# Patient Record
Sex: MALE | Race: OTHER | URBAN - NONMETROPOLITAN AREA
[De-identification: names, ages, dates, MRNs, and addresses within clinical notes are randomized per-mention and may not be internally consistent; named-entity substitution may affect disease eponyms.]

---

## 2019-08-01 ENCOUNTER — OFFICE VISIT (OUTPATIENT)
Dept: PRIMARY CARE CLINIC | Age: 34
End: 2019-08-01

## 2019-08-01 VITALS
RESPIRATION RATE: 18 BRPM | OXYGEN SATURATION: 98 % | HEART RATE: 84 BPM | WEIGHT: 242 LBS | TEMPERATURE: 98.2 F | DIASTOLIC BLOOD PRESSURE: 74 MMHG | SYSTOLIC BLOOD PRESSURE: 116 MMHG
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
## 2019-08-01 PROCEDURE — 99202 OFFICE O/P NEW SF 15 MIN: CPT | Performed by: NURSE PRACTITIONER

## 2019-08-01 RX ORDER — IBUPROFEN 800 MG/1
800 TABLET ORAL EVERY 8 HOURS PRN
COMMUNITY

## 2019-08-01 NOTE — PROGRESS NOTES
CL, CO2, BUN, CREATININE, GLUCOSE in the last 72 hours. Hepatic:  No results for input(s): AST, ALT, ALB, BILITOT, ALKPHOS in the last 72 hours. Pertinent lab and radiology results reviewed. General Appearance: well developed, well nourished, and in no acute distress  Skin: warm and dry, no rash, no erythema (site of tick removal appears to be healing without erythema, edema, or drainage present)  Head: normocephalic and atraumatic  Eyes: pupils equal, round, and reactive to light, sclerae white, conjunctivae normal  ENT: left tympanic membrane normal, right tympanic membrane normal, left external ear normal, right external ear normal, left ear canal normal, right ear canal normal, nose without deformity, nasal mucosa and turbinates normal, pharynx normal, sinuses non-tender  Neck: supple and non-tender without mass, no thyromegaly or thyroid nodules, no cervical lymphadenopathy  Pulmonary/Chest: clear to auscultation bilaterally- no wheezes, rales or rhonchi, normal air movement, no respiratory distress  Cardiovascular: normal rate, regular rhythm, normal S1 and S2, no audible murmurs, rubs, or clicks, distal pulses intact  Abdomen: soft, non-tender, non-distended, normal bowel sounds, no masses or organomegaly  Extremities: no cyanosis, no clubbing, no edema  Musculoskeletal: normal range of motion, no joint swelling, no obvious bony deformity, no tenderness  Neurologic: no acute gross cranial nerve deficit, gait normal, and speech normal  Psychologic: oriented to person, place, and time, appropriate mood and affect for situation      Assessment and Plan:     Diagnosis Orders   1. Tick bite, initial encounter       Photo of tick attached to the patient was reviewed (provided by patient)--it was not embedded. Will observe and consider Doxycycline if signs of infection occur. Education provided. Place ice intermittently prn and OTC antihistamine prn. Avoid ticks.   Follow up with PCP/as

## 2019-08-01 NOTE — PATIENT INSTRUCTIONS
Patient Education        Tick Bite: Care Instructions  Your Care Instructions    Ticks are small spiderlike animals. They bite to fasten themselves onto your skin and feed on your blood. Ticks can carry diseases. But most ticks do not carry diseases, and most tick bites do not cause serious health problems. Some people may have an allergic reaction to a tick bite. This reaction may be mild, with symptoms like itching and swelling. In rare cases, a severe allergic reaction may occur. Most of the time, all you need to do for a tick bite is relieve any symptoms you may have. Follow-up care is a key part of your treatment and safety. Be sure to make and go to all appointments, and call your doctor if you are having problems. It's also a good idea to know your test results and keep a list of the medicines you take. How can you care for yourself at home? · Put ice or a cold pack on the bite for 15 to 20 minutes once an hour. Put a thin cloth between the ice and your skin. · Try an over-the-counter medicine to relieve itching, redness, swelling, and pain. Be safe with medicines. Read and follow all instructions on the label. ? Take an antihistamine medicine, such as a nondrowsy one like loratadine (Claritin) or one that might make you sleepy like diphenhydramine (Benadryl). These medicines may help relieve itching, redness, and swelling. ? Use a spray of local anesthetic that contains benzocaine, such as Solarcaine. It may help relieve pain. If your skin reacts to the spray, stop using it. ? Put calamine lotion on the skin. It may help relieve itching. To avoid tick bites  · Avoid ticks:  ? Learn where ticks are found in your community, and stay away from those areas if possible. ? Cover as much of your body as possible when you work or play in grassy or wooded areas. ? Use insect repellents, such as products containing DEET. You can spray them on your skin.   ? Take steps to control ticks on your property if you live in an area where Lyme disease occurs. Clear leaves, brush, tall grasses, woodpiles, and stone fences from around your house and the edges of your yard or garden. This may help get rid of ticks. · When you come in from outdoors, check your body for ticks, including your groin, head, and underarms. The ticks may be about the size of a sesame seed. If no one else can help you check for ticks on your scalp, comb your hair with a fine-tooth comb. · If you find a tick, remove it quickly. Use tweezers to grasp the tick as close to its mouth (the part in your skin) as possible. Slowly pull the tick straight out--do not twist or yank--until its mouth releases from your skin. If part of the tick stays in the skin, leave it alone. It will likely come out on its own in a few days. · Ticks can come into your house on clothing, outdoor gear, and pets. These ticks can fall off and attach to you. ? Check your clothing and outdoor gear. Remove any ticks you find. Then put your clothing in a clothes dryer on high heat for 1 hour to kill any ticks that might remain. ? Check your pets for ticks after they have been outdoors. · When hiking in the woods, carry a small dry jar or ziplock bag. If you find a tick on your body, remove the tick and put it in the jar or bag. Store the container in the freezer so you can give it to your doctor if symptoms develop. The tick can be tested to learn whether it is carrying the bacteria that cause Lyme disease. When should you call for help? Call 911 anytime you think you may need emergency care. For example, call if:    · You have symptoms of a severe allergic reaction. These may include:  ? Sudden raised, red areas (hives) all over your body. ? Swelling of the throat, mouth, lips, or tongue. ? Trouble breathing. ? Passing out (losing consciousness).  Or you may feel very lightheaded or suddenly feel weak, confused, or restless.    Call your doctor now or seek immediate medical